# Patient Record
Sex: FEMALE | Race: ASIAN
[De-identification: names, ages, dates, MRNs, and addresses within clinical notes are randomized per-mention and may not be internally consistent; named-entity substitution may affect disease eponyms.]

---

## 2018-01-21 ENCOUNTER — HOSPITAL ENCOUNTER (EMERGENCY)
Dept: HOSPITAL 25 - UCEAST | Age: 28
Discharge: HOME | End: 2018-01-21
Payer: COMMERCIAL

## 2018-01-21 VITALS — DIASTOLIC BLOOD PRESSURE: 70 MMHG | SYSTOLIC BLOOD PRESSURE: 113 MMHG

## 2018-01-21 DIAGNOSIS — J11.1: Primary | ICD-10-CM

## 2018-01-21 DIAGNOSIS — E86.0: ICD-10-CM

## 2018-01-21 DIAGNOSIS — Z88.0: ICD-10-CM

## 2018-01-21 DIAGNOSIS — R19.7: ICD-10-CM

## 2018-01-21 PROCEDURE — 96360 HYDRATION IV INFUSION INIT: CPT

## 2018-01-21 PROCEDURE — 87502 INFLUENZA DNA AMP PROBE: CPT

## 2018-01-21 PROCEDURE — 99212 OFFICE O/P EST SF 10 MIN: CPT

## 2018-01-21 PROCEDURE — G0463 HOSPITAL OUTPT CLINIC VISIT: HCPCS

## 2018-01-21 NOTE — UC
Carlton ZELAYA Stephanie, scribed for Alphonse Barrett MD on 01/21/18 at 1539 .





FLU HPI





- HPI Summary


HPI Summary: 


The pt is a 28 y/o F presenting to  with c/o dizziness that began yesterday. 

The pt reports prior influenza-like symptoms such as fever, body aches, sore 

throat, nausea, blurred visions when ambulating, abd pain, decreased oral 

intake and diarrhea that began 1 week ago. The pt denies lightheadedness or 

blood with the diarrhea. Dizziness is alleviated by rest. 











- History of Current Complaint


Chief Complaint: UCGeneralIllness


Stated Complaint: COUGH, CONGESTION C


Time Seen by Provider: 01/21/18 14:53


Hx Obtained From: Patient


Hx Last Menstrual Period: 1/2018


Pregnant?: No


Onset/Duration: Lasting Days - 1, Still Present


Pain Intensity: 0


Pain Scale Used: 0-10 Numeric


Associated Signs & Symptoms: Positive: Diarrhea.  Negative: Fever - not 

presently





- Allergy/Home Medications


Allergies/Adverse Reactions: 


 Allergies











Allergy/AdvReac Type Severity Reaction Status Date / Time


 


Penicillins Allergy  Unknown Verified 01/21/18 13:22





   Reaction  





   Details  














PMH/Surg Hx/FS Hx/Imm Hx


Previously Healthy: Yes - Pt denies any past medical history. 





- Surgical History


Surgical History: None





- Family History


Known Family History: Positive: Unknown - Pt denies any medical history.





- Social History


Occupation: Student


Lives: Dormitory/Roommates


Alcohol Use: None


Substance Use Type: None


Smoking Status (MU): Never Smoked Tobacco





- Immunization History


Most Recent Influenza Vaccination: none





Review of Systems


Constitutional: Other - dizziness


Skin: Negative


Eyes: Blurred Vision


ENT: Sore Throat


Respiratory: Negative


Cardiovascular: Negative


Gastrointestinal: Abdominal Pain, Diarrhea, Nausea


Genitourinary: Negative


Motor: Negative


Neurovascular: Negative


Musculoskeletal: Myalgia


Neurological: Negative


Psychological: Negative


All Other Systems Reviewed And Are Negative: Yes





Physical Exam


Triage Information Reviewed: Yes


Vital Signs: 


 Initial Vital Signs











Temp  97.1 F   01/21/18 13:16


 


Pulse  98   01/21/18 13:16


 


Resp  14   01/21/18 13:16


 


BP  110/72   01/21/18 13:16


 


Pulse Ox  97   01/21/18 13:16











Vital Signs Reviewed: Yes





- Additional Comments


General: Mildly ill-appearing, no pain distress


Skin: warm, color reflects adequate perfusion, dry


Head: normal


Eyes: EOMI, SHELLEY


ENT: normal


Neck: supple, nontender


Respiratory: CTA, breath sounds present


Cardiovascular: RRR


Abdomen: soft, nontender


Bowel: present


Musculoskeletal: normal, strength/ROM intact


Neurological: normal, sensory/motor intact, A&O x3


Psychological: affect/mood appropriate











Flu Course/Dx





- Course


Course Of Treatment: Medication reviewed.





- Differential Dx/Diagnosis


Provider Diagnoses: INFLUENZA, DEHYDRATION





Discharge





- Discharge Plan


Condition: Stable


Disposition: HOME


Prescriptions: 


Ondansetron ODT TAB* [Zofran 4 MG Odt TAB*] 4 mg PO Q6H PRN #10 tab.odt


 PRN Reason: Nausea


Oseltamivir CAP* [Tamiflu CAP*] 75 mg PO BID #9 cap


Patient Education Materials:  Dehydration (ED), Influenza (ED)


Referrals: 


[facility] [Provider Group]


No Primary Care Phys,[facility] [Primary Care Provider] - 


Additional Instructions: 


FOLLOW UP WITH YOUR DOCTOR.


GET RECHECKED FOR ANY WORSENING OF YOUR CONDITION OR QUESTIONS OR CONCERNS.





The documentation as recorded by the Carlton jung Stephanie accurately reflects 

the service I personally performed and the decisions made by me, Alphonse Barrett MD.

## 2021-06-01 ENCOUNTER — PREPPED CHART (OUTPATIENT)
Dept: URBAN - METROPOLITAN AREA CLINIC 102 | Facility: CLINIC | Age: 31
End: 2021-06-01

## 2021-06-01 PROBLEM — H04.123 DRY EYE SYNDROME: Noted: 2021-06-01

## 2021-06-01 PROBLEM — H52.13 MYOPIA: Noted: 2021-06-01

## 2022-02-22 ENCOUNTER — COMPLETE EYE EXAM (OUTPATIENT)
Dept: URBAN - METROPOLITAN AREA CLINIC 102 | Facility: CLINIC | Age: 32
End: 2022-02-22

## 2022-02-22 DIAGNOSIS — H52.13: ICD-10-CM

## 2022-02-22 PROCEDURE — 92014 COMPRE OPH EXAM EST PT 1/>: CPT

## 2022-02-22 PROCEDURE — 92015 DETERMINE REFRACTIVE STATE: CPT

## 2022-02-22 ASSESSMENT — TONOMETRY
OS_IOP_MMHG: 17
OD_IOP_MMHG: 17

## 2022-02-22 ASSESSMENT — VISUAL ACUITY: OS_CC: 20/25

## 2022-07-13 ENCOUNTER — ESTABLISHED (OUTPATIENT)
Dept: URBAN - METROPOLITAN AREA CLINIC 102 | Facility: CLINIC | Age: 32
End: 2022-07-13

## 2022-07-13 DIAGNOSIS — H04.123: ICD-10-CM

## 2022-07-13 PROCEDURE — 92012 INTRM OPH EXAM EST PATIENT: CPT

## 2022-07-13 ASSESSMENT — TONOMETRY
OD_IOP_MMHG: 11
OS_IOP_MMHG: 13

## 2022-07-13 ASSESSMENT — VISUAL ACUITY
OS_CC: 20/25-2
OD_CC: 20/30+2

## 2023-08-18 ENCOUNTER — ESTABLISHED (OUTPATIENT)
Dept: URBAN - METROPOLITAN AREA CLINIC 102 | Facility: CLINIC | Age: 33
End: 2023-08-18

## 2023-08-18 DIAGNOSIS — H52.13: ICD-10-CM

## 2023-08-18 PROCEDURE — 92014 COMPRE OPH EXAM EST PT 1/>: CPT

## 2023-08-18 PROCEDURE — 92015 DETERMINE REFRACTIVE STATE: CPT

## 2023-08-18 ASSESSMENT — VISUAL ACUITY
OU_CC: 20/20-1
OD_CC: 20/30+2
OS_CC: 20/30+2

## 2023-08-18 ASSESSMENT — TONOMETRY
OS_IOP_MMHG: 16
OD_IOP_MMHG: 15